# Patient Record
Sex: FEMALE | Race: WHITE | NOT HISPANIC OR LATINO | Employment: OTHER | URBAN - NONMETROPOLITAN AREA
[De-identification: names, ages, dates, MRNs, and addresses within clinical notes are randomized per-mention and may not be internally consistent; named-entity substitution may affect disease eponyms.]

---

## 2017-02-02 ENCOUNTER — OFFICE VISIT (OUTPATIENT)
Dept: CARDIOLOGY | Facility: CLINIC | Age: 67
End: 2017-02-02

## 2017-02-02 VITALS
SYSTOLIC BLOOD PRESSURE: 108 MMHG | HEIGHT: 65 IN | DIASTOLIC BLOOD PRESSURE: 65 MMHG | HEART RATE: 67 BPM | WEIGHT: 168 LBS | OXYGEN SATURATION: 98 % | BODY MASS INDEX: 27.99 KG/M2

## 2017-02-02 DIAGNOSIS — I10 ESSENTIAL HYPERTENSION: ICD-10-CM

## 2017-02-02 DIAGNOSIS — I07.1 RHEUMATIC TRICUSPID VALVE REGURGITATION: ICD-10-CM

## 2017-02-02 DIAGNOSIS — E78.2 MIXED HYPERLIPIDEMIA: Primary | ICD-10-CM

## 2017-02-02 DIAGNOSIS — R01.1 HEART MURMUR: ICD-10-CM

## 2017-02-02 DIAGNOSIS — R60.9 EDEMA, UNSPECIFIED TYPE: ICD-10-CM

## 2017-02-02 DIAGNOSIS — I05.1 RHEUMATIC MITRAL REGURGITATION: ICD-10-CM

## 2017-02-02 PROBLEM — I34.0 MR (MITRAL REGURGITATION): Status: ACTIVE | Noted: 2017-02-02

## 2017-02-02 PROBLEM — H35.52 RETINITIS PIGMENTOSA: Status: ACTIVE | Noted: 2017-02-02

## 2017-02-02 PROBLEM — K74.60 LIVER CIRRHOSIS (HCC): Status: ACTIVE | Noted: 2017-02-02

## 2017-02-02 PROCEDURE — 99214 OFFICE O/P EST MOD 30 MIN: CPT | Performed by: INTERNAL MEDICINE

## 2017-02-02 RX ORDER — ATORVASTATIN CALCIUM 20 MG/1
TABLET, FILM COATED ORAL DAILY
Refills: 2 | COMMUNITY
Start: 2017-01-05 | End: 2017-02-02

## 2017-02-02 RX ORDER — HYDROCHLOROTHIAZIDE 12.5 MG/1
12.5 TABLET ORAL DAILY
COMMUNITY

## 2017-02-02 NOTE — PROGRESS NOTES
Subjective   Ramila Ramirez is a 66 y.o. female     Chief Complaint   Patient presents with   • Follow-up       PROBLEM LIST:     1. Chest pain  2. Hypertension  3. Hypothyroidism  4. 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 Dyslipidemia, Lipitor stopped By Dr. Armendariz due to liver disease   6. Lower extremity edema  7. Heart murmur   7.1 h/o rheumatic fever   8. Liver cirrhosis       Specialty Problems     None            HPI:  Ms. Ramirez returns for follow-up on chest pain, dyspnea, and heart murmur.    She was originally referred to us in the summer of 2016 because of changing heart murmur.  At that time she described chest pain with some features compatible with angina, as well as mild to moderate exertional dyspnea.  On exam she had a systolic murmur appreciated and transmitted murmur versus carotid bruits.    Pharmacologic stress testing, echo, and carotid duplex study were performed.  Stress testing demonstrated no evidence of ischemia with preserved LV systolic function.  Echo demonstrated mildly thickened mitral leaflets are somewhat suggestive of rheumatic involvement with mild to moderate mitral regurgitation and mild mitral stenosis with a mitral valve area of 2.1 cm² by pressure half-time data.  The aortic valve was unremarkable, the left atrium was moderately dilated with grade 2 diastolic dysfunction and PA systolic pressures were 30-35.  Carotid duplex study demonstrated no significant disease.    Since the time of her last visit Ms. Ramirez was diagnosed with nonalcoholic hepatic cirrhosis.  This was thought secondary to her chronic Indocin use.  She no longer has any chest discomfort, she denies orthopnea, PND, or change in very mild lower extremity edema, she doesn't palpitate, and she's not been presyncopal or syncopal.  She has mild orthostatic lightheadedness.  Her exertional dyspnea remains unchanged.              CURRENT MEDICATION:    Current Outpatient Prescriptions   Medication Sig Dispense  Refill   • aspirin 81 MG EC tablet Take 81 mg by mouth daily.     • cetirizine (ZyrTEC) 10 MG tablet Take 1 tablet by mouth daily.     • cholecalciferol (VITAMIN D3) 1000 UNITS tablet Take 1,000 Units by mouth daily.     • diltiazem (TIAZAC) 300 MG 24 hr capsule Take 1 capsule by mouth daily.     • hydrochlorothiazide (HYDRODIURIL) 12.5 MG tablet Take 12.5 mg by mouth Daily.     • levothyroxine (SYNTHROID, LEVOTHROID) 100 MCG tablet Take 1 tablet by mouth daily.     • nitroglycerin (NITROSTAT) 0.4 MG SL tablet 1 under the tongue as needed for angina, may repeat q5mins for up three doses 25 tablet 11   • potassium chloride (MICRO-K) 10 MEQ CR capsule Take 1 capsule by mouth 3 (three) times a day.     • venlafaxine XR (EFFEXOR-XR) 75 MG 24 hr capsule Take 1 capsule by mouth 3 (three) times a day.     • vitamin A 8000 UNIT capsule Take 8,000 Units by mouth daily.       No current facility-administered medications for this visit.        ALLERGIES:    Codeine    PAST MEDICAL HISTORY:    Past Medical History   Diagnosis Date   • Hyperlipidemia    • Hypertension    • Hypothyroid    • Liver cirrhosis    • Murmur, cardiac        SURGICAL HISTORY:    Past Surgical History   Procedure Laterality Date   • Hysterectomy     • Bladder sling modified, anterior and posterior vaginal repair         SOCIAL HISTORY:    Social History     Social History   • Marital status:      Spouse name: N/A   • Number of children: N/A   • Years of education: N/A     Occupational History   • Not on file.     Social History Main Topics   • Smoking status: Former Smoker     Quit date: 1/1/2015   • Smokeless tobacco: Never Used   • Alcohol use No   • Drug use: No   • Sexual activity: Not on file     Other Topics Concern   • Not on file     Social History Narrative       FAMILY HISTORY:    Family History   Problem Relation Age of Onset   • Lung cancer Mother    • Hypertension Mother    • Heart failure Father    • Heart disease Sister    • Heart  "attack Sister        Review of Systems   Constitutional: Negative.    HENT: Negative.    Eyes: Positive for visual disturbance.   Respiratory: Negative.  Negative for chest tightness and shortness of breath (no orthopnea or PND).    Cardiovascular: Positive for palpitations (occurs with stress or when upset ) and leg swelling (occasionally ). Negative for chest pain.   Gastrointestinal: Negative.  Negative for abdominal pain, blood in stool, constipation, diarrhea, nausea and vomiting.   Endocrine: Negative.  Negative for cold intolerance and heat intolerance.   Genitourinary: Negative.    Musculoskeletal: Negative.    Skin: Negative.    Allergic/Immunologic: Negative.    Neurological: Negative.  Negative for dizziness, seizures, syncope, facial asymmetry, speech difficulty, weakness, light-headedness, numbness and headaches.   Hematological: Negative.    Psychiatric/Behavioral: Negative.        VISIT VITALS:    Visit Vitals   • /65   • Pulse 67   • Ht 65\" (165.1 cm)   • Wt 168 lb (76.2 kg)   • SpO2 98%   • BMI 27.96 kg/m2       RECENT LABS:    Objective       Physical Exam   Constitutional: She appears well-developed and well-nourished. No distress.   HENT:   Head: Normocephalic and atraumatic.   Eyes: Conjunctivae and EOM are normal. Pupils are equal, round, and reactive to light.   Neck: Normal range of motion. Neck supple. Normal carotid pulses, no hepatojugular reflux and no JVD present. Carotid bruit is not present.   Cardiovascular: Exam reveals no gallop and no friction rub.    Murmur (1-2/ 6 tricusid regurgitation murmur, 1-2/6 mitral regurgiation murmur ) heard.   Systolic murmur is present with a grade of 1/6   Pulses:       Radial pulses are 2+ on the right side, and 2+ on the left side.        Dorsalis pedis pulses are 2+ on the right side, and 2+ on the left side.        Posterior tibial pulses are 2+ on the right side, and 2+ on the left side.   Pulmonary/Chest: Effort normal and breath sounds " normal. No respiratory distress. She has no decreased breath sounds. She has no wheezes. She has no rhonchi. She has no rales. She exhibits no tenderness.   Abdominal: Soft. Bowel sounds are normal. She exhibits no distension, no abdominal bruit and no mass. There is no tenderness.   Unable to feel liver or spleen edge   Negative organomegaly      Musculoskeletal: She exhibits edema (trace edema at the sockline LLE).   Neurological: She is alert.   Skin: She is not diaphoretic.   Nursing note and vitals reviewed.      Procedures      Assessment/Plan   #1.  Chest pain with some features of angina, currently quiescent, occurring in the setting of low risk stress test.  No further evaluation is indicated at this time.    #2.  Mild to moderate mitral regurgitation and mild mitral stenosis with grade 2 diastolic dysfunction, left atrial enlargement, but only high normal pulmonary pressures.  The patient relates having rheumatic fever as a child, therefore rheumatic etiology is likely.  The patient is not symptomatic at this time we will continue close clinical observation only.    #3.  In the setting of cirrhosis I think it's reasonable to stop atorvastatin in this patient without known coronary disease.    #4.  I'm extended discussion with the patient today about any symptoms which might represent ischemia, heart failure, or significant dysrhythmia.  We also discussed symptoms of embolism or endocarditis.  Ms. Ramirez will report any of these immediately.    #5.  Otherwise, I will plan on seeing the patient on a yearly basis or when necessary for symptoms.             Diagnosis Plan   1. Mixed hyperlipidemia     2. Edema, unspecified type     3. Essential hypertension     4. Heart murmur     5. Rheumatic mitral regurgitation     6. Rheumatic tricuspid valve regurgitation         Return in about 1 year (around 2/2/2018), or if symptoms worsen or fail to improve, for Next scheduled follow up.         Shine Armendariz MD

## 2017-08-29 ENCOUNTER — TELEPHONE (OUTPATIENT)
Dept: CARDIOLOGY | Facility: CLINIC | Age: 67
End: 2017-08-29

## 2017-08-29 NOTE — TELEPHONE ENCOUNTER
Spoke with pt PCP. Patient's atorvastatin was stopped back in february due to pt liver cirrhosis and pt not having known CAD. Pt PCP verbalize understanding and is suppose to send us more updated lipid panel and will review with Dr. Armendariz.       ----- Message from Lian Yuen LPN sent at 8/29/2017  4:25 PM EDT -----  Contact: pt  - [phone]      ----- Message -----     From: Della Hernandez     Sent: 8/29/2017   2:19 PM       To: [facility]    Her cholesterol levels were high and we had taken her off of her Crestor. Her family  wants to know why we took her off the cholesterol. Narda Reese is her primary care [phone]

## 2018-02-09 ENCOUNTER — TELEPHONE (OUTPATIENT)
Dept: CARDIOLOGY | Facility: CLINIC | Age: 68
End: 2018-02-09

## 2018-02-09 NOTE — TELEPHONE ENCOUNTER
Patient's PCP called wanting medication verification. I explained to the PCP that the patient has not been seen in a year and it looked like her recently appointment was cancelled. I gave them her new appointment.

## 2019-07-25 ENCOUNTER — OFFICE VISIT (OUTPATIENT)
Dept: CARDIOLOGY | Facility: CLINIC | Age: 69
End: 2019-07-25

## 2019-07-25 VITALS
DIASTOLIC BLOOD PRESSURE: 68 MMHG | OXYGEN SATURATION: 98 % | BODY MASS INDEX: 27.49 KG/M2 | HEIGHT: 65 IN | HEART RATE: 63 BPM | SYSTOLIC BLOOD PRESSURE: 115 MMHG | WEIGHT: 165 LBS

## 2019-07-25 DIAGNOSIS — I05.1 RHEUMATIC MITRAL REGURGITATION: ICD-10-CM

## 2019-07-25 DIAGNOSIS — I10 ESSENTIAL HYPERTENSION: ICD-10-CM

## 2019-07-25 DIAGNOSIS — I34.0 MITRAL VALVE INSUFFICIENCY, UNSPECIFIED ETIOLOGY: ICD-10-CM

## 2019-07-25 DIAGNOSIS — I05.0 RHEUMATIC MITRAL STENOSIS: ICD-10-CM

## 2019-07-25 DIAGNOSIS — R06.02 SOB (SHORTNESS OF BREATH): Primary | ICD-10-CM

## 2019-07-25 PROCEDURE — 99213 OFFICE O/P EST LOW 20 MIN: CPT | Performed by: PHYSICIAN ASSISTANT

## 2019-07-25 PROCEDURE — 93000 ELECTROCARDIOGRAM COMPLETE: CPT | Performed by: PHYSICIAN ASSISTANT

## 2019-07-25 RX ORDER — NADOLOL 20 MG/1
20 TABLET ORAL DAILY
Refills: 3 | COMMUNITY
Start: 2019-07-05

## 2019-07-25 RX ORDER — LORATADINE 10 MG/1
TABLET ORAL DAILY
Refills: 2 | COMMUNITY
Start: 2019-07-05

## 2019-07-25 RX ORDER — RIFAXIMIN 550 MG/1
550 TABLET ORAL 2 TIMES DAILY
Refills: 2 | COMMUNITY
Start: 2019-07-17

## 2019-07-25 RX ORDER — FERROUS SULFATE 325(65) MG
1 TABLET ORAL DAILY
Refills: 2 | COMMUNITY
Start: 2019-07-01

## 2019-07-25 RX ORDER — BIOTIN 1 MG
1000 TABLET ORAL DAILY
COMMUNITY

## 2019-07-25 RX ORDER — SPIRONOLACTONE 25 MG/1
25 TABLET ORAL DAILY
Refills: 3 | COMMUNITY
Start: 2019-07-05

## 2019-07-25 RX ORDER — LACTULOSE 10 G/15ML
SOLUTION ORAL
Refills: 5 | COMMUNITY
Start: 2019-07-19

## 2019-07-25 RX ORDER — RANITIDINE 150 MG/1
TABLET ORAL 2 TIMES DAILY
Refills: 2 | COMMUNITY
Start: 2019-05-23

## 2019-07-25 RX ORDER — ERGOCALCIFEROL 1.25 MG/1
50000 CAPSULE ORAL WEEKLY
Refills: 2 | COMMUNITY
Start: 2019-07-05

## 2019-07-25 NOTE — PROGRESS NOTES
Problem list     Subjective   Ramila Ramirez is a 68 y.o. female     Chief Complaint   Patient presents with   • Heart Murmur     presents for f/u   • Shortness of Breath       HPI    Problem list  1.  Valvular heart disease  1.1 mild mitral stenosis and mild to moderate mitral regurgitation by echocardiogram in 2017  2.  Low risk stress test 2017  3.  Hypertension  4.  Blindness    Patient is a 68-year-old female who presents back to the office for follow-up.  She is here for routine follow-up although it has been approximately 2 years since being evaluated.  She has done remarkably well.    She does not describe any chest pain or pressure.  Dyspnea is very mild.  She has no progressive shortness of breath.  No PND orthopnea.    She does not palpitate or have dysrhythmic symptoms.  She is feeling well otherwise      Outpatient Encounter Medications as of 7/25/2019   Medication Sig Dispense Refill   • aspirin 81 MG EC tablet Take 81 mg by mouth daily.     • Biotin 1000 MCG tablet Take 1,000 mcg by mouth Daily.     • cholecalciferol (VITAMIN D3) 1000 UNITS tablet Take 1,000 Units by mouth daily.     • diltiazem (TIAZAC) 300 MG 24 hr capsule Take 1 capsule by mouth daily.     • FEROSUL 325 (65 Fe) MG tablet Take 1 tablet by mouth Daily.  2   • hydrochlorothiazide (HYDRODIURIL) 12.5 MG tablet Take 12.5 mg by mouth Daily.     • lactulose (CHRONULAC) 10 GM/15ML solution TAKE 30ML BY MOUTH TWICE A DAY  5   • levothyroxine (SYNTHROID, LEVOTHROID) 100 MCG tablet Take 1 tablet by mouth daily.     • loratadine (CLARITIN) 10 MG tablet Daily.  2   • nadolol (CORGARD) 20 MG tablet Take 20 mg by mouth Daily.  3   • nitroglycerin (NITROSTAT) 0.4 MG SL tablet 1 under the tongue as needed for angina, may repeat q5mins for up three doses 25 tablet 11   • potassium chloride (MICRO-K) 10 MEQ CR capsule Take 1 capsule by mouth 3 (three) times a day.     • raNITIdine (ZANTAC) 150 MG tablet 2 (Two) Times a Day.  2   • spironolactone  (ALDACTONE) 25 MG tablet Take 25 mg by mouth Daily. with food  3   • venlafaxine XR (EFFEXOR-XR) 75 MG 24 hr capsule Take 1 capsule by mouth 3 (three) times a day.     • vitamin A 8000 UNIT capsule Take 8,000 Units by mouth daily.     • vitamin D (ERGOCALCIFEROL) 15491 units capsule capsule Take 50,000 Units by mouth 1 (One) Time Per Week.  2   • XIFAXAN 550 MG tablet Take 550 mg by mouth 2 (Two) Times a Day.  2   • [DISCONTINUED] cetirizine (ZyrTEC) 10 MG tablet Take 1 tablet by mouth daily.       No facility-administered encounter medications on file as of 2019.        Codeine and Augmentin [amoxicillin-pot clavulanate]    Past Medical History:   Diagnosis Date   • Coma (CMS/HCC)    • Hyperlipidemia    • Hypertension    • Hypothyroid    • Liver cirrhosis (CMS/HCC)    • Murmur, cardiac        Social History     Socioeconomic History   • Marital status:      Spouse name: Not on file   • Number of children: Not on file   • Years of education: Not on file   • Highest education level: Not on file   Tobacco Use   • Smoking status: Former Smoker     Last attempt to quit: 2015     Years since quittin.5   • Smokeless tobacco: Never Used   Substance and Sexual Activity   • Alcohol use: No   • Drug use: No       Family History   Problem Relation Age of Onset   • Lung cancer Mother    • Hypertension Mother    • Heart failure Father    • Heart disease Sister    • Heart attack Sister        Review of Systems   Constitutional: Positive for diaphoresis (after taking evening meds) and fatigue.   HENT: Negative.    Eyes: Positive for visual disturbance.   Respiratory: Positive for shortness of breath (on exertion) and wheezing.    Cardiovascular: Positive for leg swelling (at times). Negative for chest pain and palpitations.   Gastrointestinal: Positive for nausea (after taking meds).   Genitourinary: Negative.    Skin: Negative.    Allergic/Immunologic: Positive for environmental allergies.   Neurological:  "Positive for dizziness.   Hematological: Negative.    Psychiatric/Behavioral: Negative.    All other systems reviewed and are negative.      Objective   Vitals:    07/25/19 1052   BP: 115/68   BP Location: Right arm   Patient Position: Sitting   Pulse: 63   SpO2: 98%   Weight: 74.8 kg (165 lb)   Height: 165.1 cm (65\")      /68 (BP Location: Right arm, Patient Position: Sitting)   Pulse 63   Ht 165.1 cm (65\")   Wt 74.8 kg (165 lb)   SpO2 98%   BMI 27.46 kg/m²     Lab Results (most recent)     None          Physical Exam   Constitutional: She is oriented to person, place, and time. She appears well-developed and well-nourished. No distress.   HENT:   Head: Normocephalic and atraumatic.   Eyes: EOM are normal. Pupils are equal, round, and reactive to light.   Neck: No JVD present.   Cardiovascular: Normal rate, regular rhythm, normal heart sounds and intact distal pulses. Exam reveals no gallop and no friction rub.   No murmur heard.  Pulmonary/Chest: Effort normal and breath sounds normal. No respiratory distress. She has no wheezes. She has no rales. She exhibits no tenderness.   Musculoskeletal: Normal range of motion. She exhibits no edema.   Neurological: She is alert and oriented to person, place, and time. No cranial nerve deficit.   Skin: Skin is warm and dry. No rash noted. No erythema. No pallor.   Psychiatric: She has a normal mood and affect. Her behavior is normal.   Nursing note and vitals reviewed.      Procedure     ECG 12 Lead  Date/Time: 7/25/2019 10:56 AM  Performed by: Neil Dunn PA  Authorized by: Neil Dunn PA   Comparison: not compared with previous ECG   Comments: EKG demonstrates sinus rhythm at 62 bpm with nonspecific ST depression at baseline               Assessment/Plan     Problems Addressed this Visit        Cardiovascular and Mediastinum    Essential hypertension    Relevant Medications    spironolactone (ALDACTONE) 25 MG tablet    nadolol (CORGARD) 20 MG " tablet    Rheumatic mitral regurgitation    Relevant Medications    nadolol (CORGARD) 20 MG tablet    Rheumatic mitral stenosis    Relevant Medications    nadolol (CORGARD) 20 MG tablet       Respiratory    SOB (shortness of breath) - Primary    Relevant Orders    ECG 12 Lead              Recommendation  1.  Patient with rheumatic mitral regurgitation and stenosis.  Patient has no symptoms to suggest worsening valvular function.  She has no significant edema.  2.  She has no chest discomfort.  Blood pressure is well controlled.  For now we will continue to monitor.  I did discuss that we may repeat ultrasound next year to reevaluate unless symptoms were to change as discussed.  We will see her back for follow-up in a year.  She will follow with primary as scheduled                Patient's Body mass index is 27.46 kg/m². BMI is within normal parameters. No follow-up required..       Electronically signed by:

## 2020-03-05 ENCOUNTER — TRANSCRIBE ORDERS (OUTPATIENT)
Dept: ADMINISTRATIVE | Facility: HOSPITAL | Age: 70
End: 2020-03-05

## 2020-03-05 DIAGNOSIS — M54.50 LOW BACK PAIN, UNSPECIFIED BACK PAIN LATERALITY, UNSPECIFIED CHRONICITY, UNSPECIFIED WHETHER SCIATICA PRESENT: ICD-10-CM

## 2020-03-05 DIAGNOSIS — M25.551 RIGHT HIP PAIN: Primary | ICD-10-CM

## 2020-03-05 DIAGNOSIS — M25.552 LEFT HIP PAIN: ICD-10-CM

## 2020-03-11 ENCOUNTER — HOSPITAL ENCOUNTER (OUTPATIENT)
Dept: MRI IMAGING | Facility: HOSPITAL | Age: 70
Discharge: HOME | End: 2020-03-11

## 2020-03-11 ENCOUNTER — HOSPITAL ENCOUNTER (OUTPATIENT)
Dept: MRI IMAGING | Facility: HOSPITAL | Age: 70
Discharge: HOME | End: 2020-03-11
Admitting: NURSE PRACTITIONER

## 2020-03-11 DIAGNOSIS — M25.551 RIGHT HIP PAIN: ICD-10-CM

## 2020-03-11 DIAGNOSIS — M25.552 LEFT HIP PAIN: ICD-10-CM

## 2020-03-11 DIAGNOSIS — M54.50 LOW BACK PAIN, UNSPECIFIED BACK PAIN LATERALITY, UNSPECIFIED CHRONICITY, UNSPECIFIED WHETHER SCIATICA PRESENT: ICD-10-CM

## 2020-03-11 PROCEDURE — 73721 MRI JNT OF LWR EXTRE W/O DYE: CPT | Performed by: RADIOLOGY

## 2020-03-11 PROCEDURE — 73721 MRI JNT OF LWR EXTRE W/O DYE: CPT

## 2020-03-11 PROCEDURE — 72148 MRI LUMBAR SPINE W/O DYE: CPT | Performed by: RADIOLOGY

## 2020-03-11 PROCEDURE — 72148 MRI LUMBAR SPINE W/O DYE: CPT
